# Patient Record
(demographics unavailable — no encounter records)

---

## 2024-10-31 NOTE — PAST MEDICAL HISTORY
[Postmenopausal] : The patient is postmenopausal [Menarche Age ____] : age at menarche was [unfilled] [Menopause Age____] : age at menopause was [unfilled] [Total Preg ___] : G[unfilled] [Live Births ___] : P[unfilled]  [AB Spont ___] : miscarriages: [unfilled]  [Age At Live Birth ___] : Age at live birth: [unfilled] [History of Hormone Replacement Treatment] : has no history of hormone replacement treatment [FreeTextEntry6] : n/a [FreeTextEntry7] : yes [FreeTextEntry8] : yes

## 2024-10-31 NOTE — PHYSICAL EXAM
[Normocephalic] : normocephalic [EOMI] : extra ocular movement intact [Supple] : supple [No Supraclavicular Adenopathy] : no supraclavicular adenopathy [No Cervical Adenopathy] : no cervical adenopathy [de-identified] : Bilateral breast/axilla/supraclavicular area: No masses, discharge, or adenopathy

## 2024-10-31 NOTE — HISTORY OF PRESENT ILLNESS
[FreeTextEntry1] : Patient is a 75yo F here for breast cancer screening. Family history of breast cancer in sister (age 73) and maternal aunt (age 60s). No genetic testing. Patient w/ h/o of right stable breast periareolar nodularity. Patient denies palpable masses, skin changes, or nipple discharge bilaterally.  3/29/18: B/L MG & US- no radiologic evidence of malignancy. no masses identified 6/6/19: B/L MG & US- KIKO 8/19/20: B/L MG & US- KIKO. 8/20/21: B/L MG & US- hetero dense, BIRADS 1 10/5/22: B/L MG & US- heterogenously dense. KIKO. BI-RADS 1 10/19/23: B/l MG & US- extremely dense. KIKO. BI-RADS 1 10/31/2024 B/L MG and US-dense breast [id number] 2

## 2024-10-31 NOTE — PHYSICAL EXAM
[Normocephalic] : normocephalic [EOMI] : extra ocular movement intact [Supple] : supple [No Supraclavicular Adenopathy] : no supraclavicular adenopathy [No Cervical Adenopathy] : no cervical adenopathy [de-identified] : Bilateral breast/axilla/supraclavicular area: No masses, discharge, or adenopathy

## 2024-10-31 NOTE — PLAN
[TextEntry] : Reviewed imaging findings and discussed results with patient. She is to return in 1 year for annual B/l MG & US and office visit.

## 2024-10-31 NOTE — HISTORY OF PRESENT ILLNESS
[FreeTextEntry1] : Patient is a 73yo F here for breast cancer screening. Family history of breast cancer in sister (age 73) and maternal aunt (age 60s). No genetic testing. Patient w/ h/o of right stable breast periareolar nodularity. Patient denies palpable masses, skin changes, or nipple discharge bilaterally.  3/29/18: B/L MG & US- no radiologic evidence of malignancy. no masses identified 6/6/19: B/L MG & US- KIKO 8/19/20: B/L MG & US- KIKO. 8/20/21: B/L MG & US- hetero dense, BIRADS 1 10/5/22: B/L MG & US- heterogenously dense. KIKO. BI-RADS 1 10/19/23: B/l MG & US- extremely dense. KIKO. BI-RADS 1 10/31/2024 B/L MG and US-dense breast [id number] 2